# Patient Record
Sex: FEMALE | Race: WHITE | NOT HISPANIC OR LATINO | Employment: FULL TIME | URBAN - METROPOLITAN AREA
[De-identification: names, ages, dates, MRNs, and addresses within clinical notes are randomized per-mention and may not be internally consistent; named-entity substitution may affect disease eponyms.]

---

## 2022-07-06 ENCOUNTER — TELEPHONE (OUTPATIENT)
Dept: SCHEDULING | Facility: IMAGING CENTER | Age: 22
End: 2022-07-06

## 2022-08-04 ENCOUNTER — OFFICE VISIT (OUTPATIENT)
Dept: URGENT CARE | Facility: PHYSICIAN GROUP | Age: 22
End: 2022-08-04
Payer: COMMERCIAL

## 2022-08-04 VITALS
OXYGEN SATURATION: 98 % | RESPIRATION RATE: 12 BRPM | HEIGHT: 67 IN | BODY MASS INDEX: 26.68 KG/M2 | WEIGHT: 170 LBS | SYSTOLIC BLOOD PRESSURE: 140 MMHG | HEART RATE: 112 BPM | TEMPERATURE: 97.8 F | DIASTOLIC BLOOD PRESSURE: 88 MMHG

## 2022-08-04 DIAGNOSIS — G43.909 MIGRAINE WITHOUT STATUS MIGRAINOSUS, NOT INTRACTABLE, UNSPECIFIED MIGRAINE TYPE: ICD-10-CM

## 2022-08-04 DIAGNOSIS — R11.0 NAUSEA: ICD-10-CM

## 2022-08-04 PROCEDURE — 99203 OFFICE O/P NEW LOW 30 MIN: CPT | Performed by: FAMILY MEDICINE

## 2022-08-04 RX ORDER — KETOROLAC TROMETHAMINE 30 MG/ML
30 INJECTION, SOLUTION INTRAMUSCULAR; INTRAVENOUS ONCE
Status: COMPLETED | OUTPATIENT
Start: 2022-08-04 | End: 2022-08-04

## 2022-08-04 RX ORDER — ONDANSETRON 4 MG/1
8 TABLET, ORALLY DISINTEGRATING ORAL ONCE
Status: COMPLETED | OUTPATIENT
Start: 2022-08-04 | End: 2022-08-04

## 2022-08-04 RX ADMIN — ONDANSETRON 8 MG: 4 TABLET, ORALLY DISINTEGRATING ORAL at 15:51

## 2022-08-04 RX ADMIN — KETOROLAC TROMETHAMINE 30 MG: 30 INJECTION, SOLUTION INTRAMUSCULAR; INTRAVENOUS at 15:51

## 2022-08-04 ASSESSMENT — ENCOUNTER SYMPTOMS
FEVER: 0
CHILLS: 0
HEADACHES: 1
VOMITING: 0
DIZZINESS: 0
SORE THROAT: 0
SHORTNESS OF BREATH: 0
COUGH: 0
NAUSEA: 1
MYALGIAS: 0

## 2022-08-04 NOTE — PROGRESS NOTES
"Subjective:   Deja Collins is a 21 y.o. female who presents for Migraine ([id number] )        Migraine  Headache pattern:  Some headache always there, and the pain level varies (Reports migraine headache, bilateral posterior orbital over the past 3 weeks similar to previous migraines)  Duration:  2 to 4 weeks  Laterality:  Both sides at the same time  Aggravating factors: Lifting heavy objects, pain compartment exposure.  Other bilateral symptoms:  Reports intermittent nausea, recent negative pregnancy testing, denies pregnancy  Abortive medications tried:  Rizatriptan    PMH:  has no past medical history on file.  MEDS: No current outpatient medications on file.    Current Facility-Administered Medications:   •  ketorolac (TORADOL) injection 30 mg, 30 mg, Intramuscular, Once, Ross Correa M.D.  •  ondansetron (ZOFRAN ODT) dispertab 8 mg, 8 mg, Oral, Once, Ross Correa M.D.  ALLERGIES: Not on File  SURGHX: History reviewed. No pertinent surgical history.  SOCHX:    FH: History reviewed. No pertinent family history.  Review of Systems   Constitutional: Negative for chills and fever.   HENT: Negative for sore throat.    Respiratory: Negative for cough and shortness of breath.    Gastrointestinal: Positive for nausea. Negative for vomiting.   Genitourinary: Negative for dysuria.   Musculoskeletal: Negative for myalgias.   Skin: Negative for rash.   Neurological: Positive for headaches. Negative for dizziness.        Objective:   BP (!) 140/88 (BP Location: Left arm, Patient Position: Sitting, BP Cuff Size: Adult)   Pulse (!) 112   Temp 36.6 °C (97.8 °F) (Temporal)   Resp 12   Ht 1.702 m (5' 7\")   Wt 77.1 kg (170 lb)   SpO2 98%   BMI 26.63 kg/m²   Physical Exam  Vitals and nursing note reviewed.   Constitutional:       General: She is not in acute distress.     Appearance: She is well-developed.   HENT:      Head: Normocephalic and atraumatic.      Right Ear: External ear normal.      Left Ear: External ear " normal.      Nose: Nose normal.      Mouth/Throat:      Mouth: Mucous membranes are moist.   Eyes:      Extraocular Movements: Extraocular movements intact.      Conjunctiva/sclera: Conjunctivae normal.      Pupils: Pupils are equal, round, and reactive to light.      Funduscopic exam:     Right eye: No hemorrhage or papilledema.         Left eye: No hemorrhage or papilledema.   Cardiovascular:      Rate and Rhythm: Normal rate.   Pulmonary:      Effort: Pulmonary effort is normal. No respiratory distress.      Breath sounds: Normal breath sounds.   Abdominal:      General: There is no distension.   Musculoskeletal:         General: Normal range of motion.   Skin:     General: Skin is warm and dry.   Neurological:      General: No focal deficit present.      Mental Status: She is alert and oriented to person, place, and time. Mental status is at baseline.      Gait: Gait (gait at baseline) normal.   Psychiatric:         Judgment: Judgment normal.           Assessment/Plan:   1. Migraine without status migrainosus, not intractable, unspecified migraine type  - ketorolac (TORADOL) injection 30 mg    2. Nausea  - ondansetron (ZOFRAN ODT) dispertab 8 mg        Medical Decision Making/Course:  In the course of preparing for this visit with review of the pertinent past medical history, recent and past clinic visits, current medications, and performing chart, immunization, medical history and medication reconciliation, and in the further course of obtaining the current history pertinent to the clinic visit today, performing an exam and evaluation, ordering and independently evaluating labs, tests  , and/or procedures, prescribing any recommended new medications as noted above including administration of ketorolac 30 mg intramuscular once and ondansetron 8 mg orally once during urgent care course, providing any pertinent counseling and education and recommending further coordination of care including recommendations for  symptomatic and supportive measures and drafting work excuse letter, at least  35 minutes of total time were spent during this encounter.      Discussed close monitoring, return precautions, and supportive measures of maintaining adequate fluid hydration and caloric intake, relative rest and symptom management as needed for pain and/or fever.    Differential diagnosis, natural history, supportive care, and indications for immediate follow-up discussed.     Advised the patient to follow-up with the primary care physician for recheck, reevaluation, and consideration of further management.    Please note that this dictation was created using voice recognition software. I have worked with consultants from the vendor as well as technical experts from [employer] to optimize the interface. I have made every reasonable attempt to correct obvious errors, but I expect that there are errors of grammar and possibly content that I did not discover before finalizing the note.

## 2022-08-04 NOTE — LETTER
August 4, 2022         Patient: Deja Collins   YOB: 2000   Date of Visit: 8/4/2022           To Whom it May Concern:    Deja Collins was seen in my clinic on 8/4/2022. Excuse 8/4/2022, 8/5/2022.  May return to work on 8/6/2022.    If you have any questions or concerns, please don't hesitate to call.        Sincerely,           Ross Correa M.D.  Electronically Signed

## 2023-11-15 ENCOUNTER — APPOINTMENT (OUTPATIENT)
Dept: RADIOLOGY | Facility: MEDICAL CENTER | Age: 23
End: 2023-11-15
Attending: STUDENT IN AN ORGANIZED HEALTH CARE EDUCATION/TRAINING PROGRAM
Payer: COMMERCIAL

## 2023-11-15 ENCOUNTER — HOSPITAL ENCOUNTER (EMERGENCY)
Facility: MEDICAL CENTER | Age: 23
End: 2023-11-15
Attending: STUDENT IN AN ORGANIZED HEALTH CARE EDUCATION/TRAINING PROGRAM
Payer: COMMERCIAL

## 2023-11-15 ENCOUNTER — OFFICE VISIT (OUTPATIENT)
Dept: URGENT CARE | Facility: PHYSICIAN GROUP | Age: 23
End: 2023-11-15
Payer: COMMERCIAL

## 2023-11-15 VITALS
TEMPERATURE: 97.9 F | SYSTOLIC BLOOD PRESSURE: 178 MMHG | RESPIRATION RATE: 20 BRPM | HEIGHT: 67 IN | HEART RATE: 122 BPM | OXYGEN SATURATION: 99 % | BODY MASS INDEX: 27.34 KG/M2 | WEIGHT: 174.16 LBS | DIASTOLIC BLOOD PRESSURE: 104 MMHG

## 2023-11-15 VITALS
SYSTOLIC BLOOD PRESSURE: 112 MMHG | RESPIRATION RATE: 16 BRPM | WEIGHT: 172 LBS | DIASTOLIC BLOOD PRESSURE: 64 MMHG | HEART RATE: 108 BPM | TEMPERATURE: 98.2 F | OXYGEN SATURATION: 100 % | BODY MASS INDEX: 27 KG/M2 | HEIGHT: 67 IN

## 2023-11-15 DIAGNOSIS — R10.31 RIGHT LOWER QUADRANT ABDOMINAL PAIN: ICD-10-CM

## 2023-11-15 DIAGNOSIS — R10.31 RLQ ABDOMINAL PAIN: ICD-10-CM

## 2023-11-15 LAB
ALBUMIN SERPL BCP-MCNC: 4.8 G/DL (ref 3.2–4.9)
ALBUMIN/GLOB SERPL: 1.8 G/DL
ALP SERPL-CCNC: 66 U/L (ref 30–99)
ALT SERPL-CCNC: 18 U/L (ref 2–50)
ANION GAP SERPL CALC-SCNC: 16 MMOL/L (ref 7–16)
APPEARANCE UR: CLEAR
AST SERPL-CCNC: 17 U/L (ref 12–45)
BASOPHILS # BLD AUTO: 1 % (ref 0–1.8)
BASOPHILS # BLD: 0.09 K/UL (ref 0–0.12)
BILIRUB SERPL-MCNC: 1.5 MG/DL (ref 0.1–1.5)
BILIRUB UR QL STRIP.AUTO: NEGATIVE
BUN SERPL-MCNC: 9 MG/DL (ref 8–22)
CALCIUM ALBUM COR SERPL-MCNC: 9.1 MG/DL (ref 8.5–10.5)
CALCIUM SERPL-MCNC: 9.7 MG/DL (ref 8.4–10.2)
CHLORIDE SERPL-SCNC: 102 MMOL/L (ref 96–112)
CO2 SERPL-SCNC: 21 MMOL/L (ref 20–33)
COLOR UR: YELLOW
CREAT SERPL-MCNC: 0.64 MG/DL (ref 0.5–1.4)
EOSINOPHIL # BLD AUTO: 0.13 K/UL (ref 0–0.51)
EOSINOPHIL NFR BLD: 1.5 % (ref 0–6.9)
ERYTHROCYTE [DISTWIDTH] IN BLOOD BY AUTOMATED COUNT: 36.5 FL (ref 35.9–50)
GFR SERPLBLD CREATININE-BSD FMLA CKD-EPI: 128 ML/MIN/1.73 M 2
GLOBULIN SER CALC-MCNC: 2.7 G/DL (ref 1.9–3.5)
GLUCOSE SERPL-MCNC: 89 MG/DL (ref 65–99)
GLUCOSE UR STRIP.AUTO-MCNC: NEGATIVE MG/DL
HCG SERPL QL: NEGATIVE
HCT VFR BLD AUTO: 45.7 % (ref 37–47)
HGB BLD-MCNC: 16 G/DL (ref 12–16)
IMM GRANULOCYTES # BLD AUTO: 0.02 K/UL (ref 0–0.11)
IMM GRANULOCYTES NFR BLD AUTO: 0.2 % (ref 0–0.9)
KETONES UR STRIP.AUTO-MCNC: NEGATIVE MG/DL
LEUKOCYTE ESTERASE UR QL STRIP.AUTO: NEGATIVE
LIPASE SERPL-CCNC: 24 U/L (ref 11–82)
LYMPHOCYTES # BLD AUTO: 2.17 K/UL (ref 1–4.8)
LYMPHOCYTES NFR BLD: 25.2 % (ref 22–41)
MCH RBC QN AUTO: 31.5 PG (ref 27–33)
MCHC RBC AUTO-ENTMCNC: 35 G/DL (ref 32.2–35.5)
MCV RBC AUTO: 90 FL (ref 81.4–97.8)
MICRO URNS: NORMAL
MONOCYTES # BLD AUTO: 0.67 K/UL (ref 0–0.85)
MONOCYTES NFR BLD AUTO: 7.8 % (ref 0–13.4)
NEUTROPHILS # BLD AUTO: 5.53 K/UL (ref 1.82–7.42)
NEUTROPHILS NFR BLD: 64.3 % (ref 44–72)
NITRITE UR QL STRIP.AUTO: NEGATIVE
NRBC # BLD AUTO: 0 K/UL
NRBC BLD-RTO: 0 /100 WBC (ref 0–0.2)
PH UR STRIP.AUTO: 5.5 [PH] (ref 5–8)
PLATELET # BLD AUTO: 265 K/UL (ref 164–446)
PMV BLD AUTO: 10 FL (ref 9–12.9)
POTASSIUM SERPL-SCNC: 3.9 MMOL/L (ref 3.6–5.5)
PROT SERPL-MCNC: 7.5 G/DL (ref 6–8.2)
PROT UR QL STRIP: NEGATIVE MG/DL
RBC # BLD AUTO: 5.08 M/UL (ref 4.2–5.4)
RBC UR QL AUTO: NEGATIVE
SODIUM SERPL-SCNC: 139 MMOL/L (ref 135–145)
SP GR UR STRIP.AUTO: 1.01
WBC # BLD AUTO: 8.6 K/UL (ref 4.8–10.8)

## 2023-11-15 PROCEDURE — 3078F DIAST BP <80 MM HG: CPT

## 2023-11-15 PROCEDURE — 83690 ASSAY OF LIPASE: CPT

## 2023-11-15 PROCEDURE — 85025 COMPLETE CBC W/AUTO DIFF WBC: CPT

## 2023-11-15 PROCEDURE — 80053 COMPREHEN METABOLIC PANEL: CPT

## 2023-11-15 PROCEDURE — 84703 CHORIONIC GONADOTROPIN ASSAY: CPT

## 2023-11-15 PROCEDURE — 99284 EMERGENCY DEPT VISIT MOD MDM: CPT

## 2023-11-15 PROCEDURE — 36415 COLL VENOUS BLD VENIPUNCTURE: CPT

## 2023-11-15 PROCEDURE — 76830 TRANSVAGINAL US NON-OB: CPT

## 2023-11-15 PROCEDURE — 700111 HCHG RX REV CODE 636 W/ 250 OVERRIDE (IP): Performed by: STUDENT IN AN ORGANIZED HEALTH CARE EDUCATION/TRAINING PROGRAM

## 2023-11-15 PROCEDURE — 3074F SYST BP LT 130 MM HG: CPT

## 2023-11-15 PROCEDURE — 76705 ECHO EXAM OF ABDOMEN: CPT

## 2023-11-15 PROCEDURE — 99213 OFFICE O/P EST LOW 20 MIN: CPT

## 2023-11-15 PROCEDURE — A9270 NON-COVERED ITEM OR SERVICE: HCPCS | Performed by: STUDENT IN AN ORGANIZED HEALTH CARE EDUCATION/TRAINING PROGRAM

## 2023-11-15 PROCEDURE — 81003 URINALYSIS AUTO W/O SCOPE: CPT

## 2023-11-15 PROCEDURE — 700102 HCHG RX REV CODE 250 W/ 637 OVERRIDE(OP): Performed by: STUDENT IN AN ORGANIZED HEALTH CARE EDUCATION/TRAINING PROGRAM

## 2023-11-15 RX ORDER — OXYCODONE HYDROCHLORIDE 5 MG/1
5 TABLET ORAL ONCE
Status: COMPLETED | OUTPATIENT
Start: 2023-11-15 | End: 2023-11-15

## 2023-11-15 RX ORDER — BUPROPION HYDROCHLORIDE 150 MG/1
150 TABLET, EXTENDED RELEASE ORAL 2 TIMES DAILY
COMMUNITY

## 2023-11-15 RX ORDER — ONDANSETRON 4 MG/1
4 TABLET, ORALLY DISINTEGRATING ORAL ONCE
Status: COMPLETED | OUTPATIENT
Start: 2023-11-15 | End: 2023-11-15

## 2023-11-15 RX ORDER — METHYLPHENIDATE HYDROCHLORIDE 20 MG/1
20 TABLET ORAL 2 TIMES DAILY
COMMUNITY

## 2023-11-15 RX ORDER — PRAZOSIN HYDROCHLORIDE 1 MG/1
1 CAPSULE ORAL NIGHTLY
COMMUNITY

## 2023-11-15 RX ORDER — ARIPIPRAZOLE 10 MG/1
10 TABLET ORAL DAILY
COMMUNITY

## 2023-11-15 RX ORDER — GABAPENTIN 100 MG/1
100 CAPSULE ORAL 3 TIMES DAILY
COMMUNITY

## 2023-11-15 RX ADMIN — OXYCODONE HYDROCHLORIDE 5 MG: 5 TABLET ORAL at 17:52

## 2023-11-15 RX ADMIN — ONDANSETRON 4 MG: 4 TABLET, ORALLY DISINTEGRATING ORAL at 17:52

## 2023-11-15 ASSESSMENT — ENCOUNTER SYMPTOMS
DIARRHEA: 0
FEVER: 0
VOMITING: 0
CHILLS: 0
NAUSEA: 1
ABDOMINAL PAIN: 1

## 2023-11-15 ASSESSMENT — FIBROSIS 4 INDEX
FIB4 SCORE: 0.29
FIB4 SCORE: 0.29

## 2023-11-15 ASSESSMENT — PAIN DESCRIPTION - PAIN TYPE: TYPE: ACUTE PAIN

## 2023-11-15 NOTE — ED TRIAGE NOTES
"Chief Complaint   Patient presents with    Abdominal Pain     RLQ abd pain vs pelvic pain. Reports hx of ovarian cysts and states this feels similar \"except that the pain goes through to my back this time\". Reports she was sent here from  for r/o appy. Denies V/D or fevers.        "

## 2023-11-15 NOTE — PROGRESS NOTES
"Subjective:   Deja Collins is a 22 y.o. female who presents for Ovarian Cyst (Pt states she has hx of ovarian cyst and states she has been having sharp dull pain. Started on (R) side and now feels it on the (L) lower abdomen )    Patient presents to urgent care with complaints of right-sided abdomen pain starting yesterday.  She reports the pain as sharp and dull and occasionally radiates to her back but more recently feels like it is radiating to her left abdomen.  She does report decreased appetite.  Denies any nausea, vomiting or diarrhea.  She denies any fevers.  Denies any dysuria or frequency.  She does report a pertinent history of ovarian cysts.      Review of Systems   Constitutional:  Negative for chills and fever.   Gastrointestinal:  Positive for abdominal pain and nausea. Negative for diarrhea and vomiting.       Medications, Allergies, and current problem list reviewed today in Epic.     Objective:     /64 (BP Location: Left arm, Patient Position: Sitting, BP Cuff Size: Adult)   Pulse (!) 108   Temp 36.8 °C (98.2 °F) (Temporal)   Resp 16   Ht 1.702 m (5' 7\")   Wt 78 kg (172 lb)   SpO2 100%     Physical Exam  Constitutional:       General: She is not in acute distress.     Appearance: Normal appearance. She is not toxic-appearing.   Cardiovascular:      Rate and Rhythm: Normal rate and regular rhythm.      Pulses: Normal pulses.      Heart sounds: Normal heart sounds.   Pulmonary:      Effort: Pulmonary effort is normal.      Breath sounds: Normal breath sounds.   Abdominal:      General: Abdomen is flat. Bowel sounds are decreased. There is no distension.      Tenderness: There is abdominal tenderness in the right lower quadrant. There is rebound. There is no right CVA tenderness, left CVA tenderness or guarding. Positive signs include McBurney's sign.   Neurological:      Mental Status: She is alert.   Psychiatric:         Mood and Affect: Mood normal.         Behavior: Behavior normal. "         Assessment/Plan:     Diagnosis and associated orders:     1. Right lower quadrant abdominal pain           Comments/MDM:     Patient presents to urgent care with complaints of right lower abdominal pain x1 day.  She reports that the pain has radiated to her back but now is radiating to the left side of her abdomen.  She denies any nausea, vomiting or diarrhea.  She does report decreased appetite.  Upon physical exam patient is tender to palpation of right abdomen.  Positive McBurney sign.  Positive rebound.  She is clear to auscultation bilaterally.  Vital signs are stable in clinic, she is afebrile.  Given physical exam and presentation of symptoms.  Patient was advised to follow-up at ER for more immediate imaging and  work-up.  Patient verbalizes understanding and is agreeable to plan.  States she will be heading over to [facility] ER. Family in waiting room available to transport patient.   Transfer center notified.         Differential diagnosis, natural history, supportive care, and indications for immediate follow-up discussed.    Advised the patient to follow-up with the primary care physician for recheck, reevaluation, and consideration of further management.    Please note that this dictation was created using voice recognition software. I have made a reasonable attempt to correct obvious errors, but I expect that there are errors of grammar and possibly content that I did not discover before finalizing the note.    This note was electronically signed by KEKE Middleton

## 2023-11-16 NOTE — ED PROVIDER NOTES
"ED Provider Note    CHIEF COMPLAINT  Chief Complaint   Patient presents with    Abdominal Pain     RLQ abd pain vs pelvic pain. Reports hx of ovarian cysts and states this feels similar \"except that the pain goes through to my back this time\". Reports she was sent here from  for r/o appy. Denies V/D or fevers.        EXTERNAL RECORDS REVIEWED  Outpatient Notes Patient was seen at urgent care earlier today.  She presented with lower abdominal pain which started on the right side and had migrated to the left.  She has a history of ovarian cyst.    HPI/ROS  LIMITATION TO HISTORY   Select: : None  OUTSIDE HISTORIAN(S):  None    Deja Collins is a 22 y.o. female who presents right lower quadrant abdominal pain.  Her pain started yesterday and has been persistent since then.  She describes it as a sharp pain that radiates through to her back.  Patient has nausea but no associated vomiting.  She has been having normal bowel movements without diarrhea or constipation.  She denies any fevers or chills.  Patient has a history of ovarian cysts and has had prior cystectomies.  She says that she has had reoccurrence of the cysts and she says this feels like when her cysts have ruptured previously.  She denies other abdominal surgeries, no history of appendectomy.  She denies any dysuria, hematuria or urgency.  She has no vaginal discharge, odor.  She denies any concern for sexually transmitted infection.    PAST MEDICAL HISTORY   has a past medical history of ADHD, Bipolar affective (HCC), Migraines, Ovarian cyst, and PTSD (post-traumatic stress disorder).    SURGICAL HISTORY  patient denies any surgical history    FAMILY HISTORY  History reviewed. No pertinent family history.    SOCIAL HISTORY  Social History     Tobacco Use    Smoking status: Never    Smokeless tobacco: Never   Substance and Sexual Activity    Alcohol use: Yes     Comment: occ    Drug use: Yes    Sexual activity: Not on file       CURRENT MEDICATIONS  Home " "Medications       Reviewed by Chantelle Guardado R.N. (Registered Nurse) on 11/15/23 at 1512  Med List Status: Not Addressed     Medication Last Dose Status   ARIPiprazole (ABILIFY) 10 MG Tab  Active   buPROPion SR (WELLBUTRIN-SR) 150 MG TABLET SR 12 HR sustained-release tablet  Active   gabapentin (NEURONTIN) 100 MG Cap  Active   methylphenidate (RITALIN) 20 MG tablet  Active   prazosin (MINIPRESS) 1 MG Cap  Active                    ALLERGIES  No Known Allergies    PHYSICAL EXAM  VITAL SIGNS: BP (!) 178/104   Pulse (!) 122   Temp 36.6 °C (97.9 °F) (Temporal)   Resp 20   Ht 1.702 m (5' 7\")   Wt 79 kg (174 lb 2.6 oz)   LMP 10/31/2023 (Approximate)   SpO2 99%   BMI 27.28 kg/m²    Constitutional: Awake and alert . Non toxic  HENT: Normal inspection. Moist mucous membranes  Eyes: Normal inspection  Neck: Grossly normal range of motion.  Cardiovascular: Tachycardic heart rate, Normal rhythm.  Symmetric peripheral pulses.   Thorax & Lungs: No respiratory distress, No wheezing, No rales, No rhonchi, No chest tenderness.   Abdomen: Soft, non-distended, RLQ TTP, no rebound or guarding, no mass  Skin: No obvious rash.  Extremities: Warm, well perfused. No clubbing, cyanosis, edema   Neurologic: Grossly normal   Psychiatric: Normal for situation      DIAGNOSTIC STUDIES / PROCEDURES      LABS  Results for orders placed or performed during the hospital encounter of 11/15/23   CBC WITH DIFFERENTIAL   Result Value Ref Range    WBC 8.6 4.8 - 10.8 K/uL    RBC 5.08 4.20 - 5.40 M/uL    Hemoglobin 16.0 12.0 - 16.0 g/dL    Hematocrit 45.7 37.0 - 47.0 %    MCV 90.0 81.4 - 97.8 fL    MCH 31.5 27.0 - 33.0 pg    MCHC 35.0 32.2 - 35.5 g/dL    RDW 36.5 35.9 - 50.0 fL    Platelet Count 265 164 - 446 K/uL    MPV 10.0 9.0 - 12.9 fL    Neutrophils-Polys 64.30 44.00 - 72.00 %    Lymphocytes 25.20 22.00 - 41.00 %    Monocytes 7.80 0.00 - 13.40 %    Eosinophils 1.50 0.00 - 6.90 %    Basophils 1.00 0.00 - 1.80 %    Immature Granulocytes 0.20 " 0.00 - 0.90 %    Nucleated RBC 0.00 0.00 - 0.20 /100 WBC    Neutrophils (Absolute) 5.53 1.82 - 7.42 K/uL    Lymphs (Absolute) 2.17 1.00 - 4.80 K/uL    Monos (Absolute) 0.67 0.00 - 0.85 K/uL    Eos (Absolute) 0.13 0.00 - 0.51 K/uL    Baso (Absolute) 0.09 0.00 - 0.12 K/uL    Immature Granulocytes (abs) 0.02 0.00 - 0.11 K/uL    NRBC (Absolute) 0.00 K/uL   COMP METABOLIC PANEL   Result Value Ref Range    Sodium 139 135 - 145 mmol/L    Potassium 3.9 3.6 - 5.5 mmol/L    Chloride 102 96 - 112 mmol/L    Co2 21 20 - 33 mmol/L    Anion Gap 16.0 7.0 - 16.0    Glucose 89 65 - 99 mg/dL    Bun 9 8 - 22 mg/dL    Creatinine 0.64 0.50 - 1.40 mg/dL    Calcium 9.7 8.4 - 10.2 mg/dL    Correct Calcium 9.1 8.5 - 10.5 mg/dL    AST(SGOT) 17 12 - 45 U/L    ALT(SGPT) 18 2 - 50 U/L    Alkaline Phosphatase 66 30 - 99 U/L    Total Bilirubin 1.5 0.1 - 1.5 mg/dL    Albumin 4.8 3.2 - 4.9 g/dL    Total Protein 7.5 6.0 - 8.2 g/dL    Globulin 2.7 1.9 - 3.5 g/dL    A-G Ratio 1.8 g/dL   LIPASE   Result Value Ref Range    Lipase 24 11 - 82 U/L   URINALYSIS,CULTURE IF INDICATED    Specimen: Urine   Result Value Ref Range    Color Yellow     Character Clear     Specific Gravity 1.015 <1.035    Ph 5.5 5.0 - 8.0    Glucose Negative Negative mg/dL    Ketones Negative Negative mg/dL    Protein Negative Negative mg/dL    Bilirubin Negative Negative    Nitrite Negative Negative    Leukocyte Esterase Negative Negative    Occult Blood Negative Negative    Micro Urine Req see below    HCG QUAL SERUM   Result Value Ref Range    Beta-Hcg Qualitative Serum Negative Negative   ESTIMATED GFR   Result Value Ref Range    GFR (CKD-EPI) 128 >60 mL/min/1.73 m 2         RADIOLOGY  I have independently interpreted the diagnostic imaging associated with this visit and am waiting the final reading from the radiologist.   My preliminary interpretation is as follows: Normal flow to ovaries  Radiologist interpretation:   US-PELVIC COMPLETE (TRANSABDOMINAL/TRANSVAGINAL) (COMBO)    Final Result      1. Septate uterus.   2. Endometrial stripe thickness is appropriate for the early proliferative phase of the menstrual cycle.   3. Normal sonographic evaluation of the ovaries.      US-APPENDIX   Final Result      The appendix is not identified sonographically.            COURSE & MEDICAL DECISION MAKING    ED Observation Status? Yes; I am placing the patient in to an observation status due to a diagnostic uncertainty as well as therapeutic intensity. Patient placed in observation status at 5:42 PM, 11/15/2023.     Observation plan is as follows: Labs, U/S, Serial Exams    Upon Reevaluation, the patient's condition has: stayed the same, however requesting to leave without obtaining CT    Patient discharged from ED Observation status at 7:07 PM 1/15/2023    INITIAL ASSESSMENT, COURSE AND PLAN  Care Narrative: 22-year-old female with history of ovarian cyst presents with right lower quadrant abdominal pain.  She is hypertensive and tachycardic on arrival but is overall systemically well-appearing.  She has right lower quadrant tenderness on exam without rebound, guarding or signs peritonitis. Labs unremarkable, she has no leukocytosis.  She is not pregnant and ectopic pregnancy ruled out.  Urine without signs of infection. Doubt PID, she denies concern for STD. Patient strongly prefers not to have an IV at this time.  I explained that we will need an IV to obtain CT scan to rule out appendicitis.  With shared decision making she prefers to first pursue ultrasound first.  Ultrasound was unremarkable she has no signs of ovarian torsion, no free fluid, no ovarian cyst noted.  Unfortunately, on ultrasound unable to visualize the appendix.     I reevaluated patient and she still having right lower quadrant tenderness and I recommended CT to evaluate for acute appendicitis.  Although her lab work is reassuring she does not have a leukocytosis, I still think further work-up with cross sectional imaging is  indicated given location of the pain without other clear explanation.  I explained this to the patient but she declines a CT scan, she has fear of needles and IV's and would prefer to monitor her symptoms at home and return if persistent or worsening symptoms.  Patient is very reliable, she is here with her partner who also agrees that he will encourage her to come back to the ER if she is not improving.  She is not intoxicated and has decisional capacity.  She does look well and repeat exam benign. Repeat vital signs were not recorded but she was improved on my assessment. She declines any means of comfort or anxiolytics to facilitate CT or IV.  Patient understands the risks of leaving at this time and is willing to accept these risks.  She assures me that she will return if still having symptoms in 24 hrs.  Patient signed AMA paperwork and was discharged home.     DISPOSITION AND DISCUSSIONS  I have discussed management of the patient with the following physicians and LEIGHA's:  None    Discussion of management with other QHP or appropriate source(s): None     Escalation of care considered, and ultimately not performed:diagnostic imaging    Barriers to care at this time, including but not limited to:  None .     Decision tools and prescription drugs considered including, but not limited to:  None .    FINAL DIAGNOSIS  1. RLQ abdominal pain Acute          Electronically signed by: Bianca Delgado M.D., 11/15/2023 5:33 PM

## 2023-11-16 NOTE — ED NOTES
Assessment complete. Pt refused IV and just wants blood draw. Labs drawn and sent. Pt medicated per ERP order. Pt aware of POC

## 2023-11-16 NOTE — DISCHARGE INSTRUCTIONS
There is still a real possibility of appendicitis, it is therefore essential that you return immediately if you have persistent abdominal pain, vomiting, fevers, inability to eat or drink.  Please do not wait more than 24 hrs to be seen if you have persistent symptoms.

## 2023-11-16 NOTE — ED NOTES
Pt spoke to ERP regarding her with to leave AMA did no with to have CT done   No s/s of acute distress pt verbalized understanding family at bedside

## 2024-12-30 ENCOUNTER — OFFICE VISIT (OUTPATIENT)
Dept: URGENT CARE | Facility: CLINIC | Age: 24
End: 2024-12-30
Payer: COMMERCIAL

## 2024-12-30 VITALS
WEIGHT: 181.4 LBS | RESPIRATION RATE: 16 BRPM | HEIGHT: 67 IN | TEMPERATURE: 97.3 F | HEART RATE: 94 BPM | SYSTOLIC BLOOD PRESSURE: 122 MMHG | DIASTOLIC BLOOD PRESSURE: 78 MMHG | OXYGEN SATURATION: 99 % | BODY MASS INDEX: 28.47 KG/M2

## 2024-12-30 DIAGNOSIS — J45.41 MODERATE PERSISTENT ASTHMA WITH ACUTE EXACERBATION: ICD-10-CM

## 2024-12-30 DIAGNOSIS — J06.9 VIRAL UPPER RESPIRATORY TRACT INFECTION: ICD-10-CM

## 2024-12-30 DIAGNOSIS — R05.1 ACUTE COUGH: ICD-10-CM

## 2024-12-30 DIAGNOSIS — J02.9 PHARYNGITIS, UNSPECIFIED ETIOLOGY: ICD-10-CM

## 2024-12-30 LAB
FLUAV RNA SPEC QL NAA+PROBE: NEGATIVE
FLUBV RNA SPEC QL NAA+PROBE: NEGATIVE
RSV RNA SPEC QL NAA+PROBE: NEGATIVE
SARS-COV-2 RNA RESP QL NAA+PROBE: NEGATIVE

## 2024-12-30 RX ORDER — SERTRALINE HYDROCHLORIDE 100 MG/1
TABLET, FILM COATED ORAL
COMMUNITY
Start: 2024-11-08

## 2024-12-30 RX ORDER — METHYLPREDNISOLONE 4 MG/1
TABLET ORAL
Qty: 21 TABLET | Refills: 0 | Status: SHIPPED | OUTPATIENT
Start: 2024-12-30

## 2024-12-30 RX ORDER — DEXTROAMPHETAMINE SACCHARATE, AMPHETAMINE ASPARTATE, DEXTROAMPHETAMINE SULFATE AND AMPHETAMINE SULFATE 2.5; 2.5; 2.5; 2.5 MG/1; MG/1; MG/1; MG/1
TABLET ORAL
COMMUNITY
Start: 2024-12-11

## 2024-12-30 RX ORDER — BENZONATATE 100 MG/1
100 CAPSULE ORAL 3 TIMES DAILY PRN
Qty: 60 CAPSULE | Refills: 0 | Status: SHIPPED | OUTPATIENT
Start: 2024-12-30

## 2024-12-30 RX ORDER — ZOLPIDEM TARTRATE 5 MG/1
5 TABLET ORAL
COMMUNITY
Start: 2024-11-09

## 2024-12-30 RX ORDER — DEXAMETHASONE SODIUM PHOSPHATE 10 MG/ML
10 INJECTION INTRAMUSCULAR; INTRAVENOUS ONCE
Status: COMPLETED | OUTPATIENT
Start: 2024-12-30 | End: 2024-12-30

## 2024-12-30 RX ORDER — FLUTICASONE PROPIONATE 50 MCG
1 SPRAY, SUSPENSION (ML) NASAL DAILY
Qty: 16 G | Refills: 0 | Status: SHIPPED | OUTPATIENT
Start: 2024-12-30

## 2024-12-30 RX ADMIN — DEXAMETHASONE SODIUM PHOSPHATE 10 MG: 10 INJECTION INTRAMUSCULAR; INTRAVENOUS at 10:37

## 2024-12-30 ASSESSMENT — FIBROSIS 4 INDEX: FIB4 SCORE: 0.35

## 2024-12-30 ASSESSMENT — VISUAL ACUITY: OU: 1

## 2024-12-30 NOTE — PATIENT INSTRUCTIONS
"As we discussed your clinical condition would benefit from over-the-counter remedies:    Congestion:    Nasal rinsing with saline nasal spray or salt water (e.g., neti pot) can help relieve nasal dryness.    Breathe Right nasal strips at night for nasal congestion    Ponaris nasal emmollient for nasal congestion, dryness, and inflammation (do not use with iodine sensitivity)    Cool mist humidification, chest rubs, warm tea with honey, increased fluid intake to thin secretions    SALINE IRRIGATION/SPRAY 2 to 3 times per day    You may consider using a saline nasal irrigation (such as a \"Neti pot\" or similar device using sterile water, NOT tap water) or OTC saline nasal spray. Common brands include Mike Med, Sinex, [place] Nasal. May use short term nasal sprays, such as oxymetazoline (Afrin) to help relieve nasal discomfort, congestion, and/or pressure. Decongestant sprays should not be used longer than three consecutive days.     Over the counter medications:    OTC second generation antihistamine daily (cetirizine, desloratadine, fexofenadine, levocetirizine, and loratadine) daily IN COMBINATION WITH:    FLONASE (once per day) x 2 weeks     You may consider intranasal steroids such as fluticasone (brand name Flonase), (other options include Nasonex, Rhinocort, Nasacort) daily; continue for at least 2-3 weeks. Any generic should work as well but may cause slightly more nasal irritation. Please take according to package directions.  This steroid will help reduce inflammation of your sinuses.  This is the number one medication to help with seasonal allergies and treat nasal inflammation.  Cost: around $8 at [facility] for the generic fluticasone [facility] product (as of 5/20).    SUDAFED (low dose to see if tolerated) daily x 4-5 days    You may consider over-the-counter Sudafed (pseudoephedrine) to act as a decongestant to improve your breathing through your nose.  Please do not use this medication if you already have known " high blood pressure.  Please take according to package directions and note that this has a stimulating effect and should not be taken late in the day.  There is a low dose 12 hour formulation and a higher dose 24 hour formulation.  Start with a low dose to make sure you tolerate the medication.  Do not take late in the day as it may interfere with sleep.   Cost: Around $6 for the generic [facility] branded product at a 10mg dose (as of 2/2023).      SORE THROAT:    Symptom management for a sore throat includes:     Sipping cold or warm beverages (eg, tea with honey or lemon)     Eating cold or frozen desserts (eg, ice cream, popsicles)    Sucking on ice    Sucking on hard candy, CEPACOL lozenges, or medicated throat sprays. These contain the active ingredient benzocaine which is an anesthetic agent.  It helps relieve the symptoms of sore throat and may diminish your cough reflex.Please note that taking too frequently may cause harm - pay attention to package directions.    Gargling with warm salt water -  ¼ to ½ teaspoon of salt per 8 ounces (approximately 240 mL) of warm water.  Cool mist humidification  OTC Tylenol/ibuprofen PRN for pain/fever     PAIN OR FEVER:    NSAIDs  You may take Ibuprofen (brand name Motrin or Advil) 600 to 800 mg may be taken up to three times per day taken with food (do not exceed 2400 mg per day).  If you prefer you may consider Naproxen (brand name Naprosyn or Aleve) around 500 mg twice per day with food (do not exceed 1200 mg per day). Please do not take if you have known stomach ulcers or known kidney disease.     TYLENOL  You may take Tylenol according to package directions (1000 mg every 8 hours not to exceed 3000 mg per day).  Please do not consume with any alcohol products, or if you have known or suspected liver disease. These will help reduce inflammation, help with pain control, and can reduce fevers.

## 2024-12-30 NOTE — PROGRESS NOTES
Verbal consent was acquired by the patient to use [employer] ambient listening note generation during this visit     Date: 12/30/24        Chief Complaint   Patient presents with    Cough     X4 days, her brother had these symptoms first now she has them    Nasal Drainage    Headache    Dizziness          History of Present Illness  The patient is a 24-year-old female who presents to the urgent care for evaluation of upper respiratory symptoms.    She began experiencing symptoms similar to her brother's illness approximately 4 to 5 days ago. Her symptoms include coughing, sneezing, and mild phlegm production. Yesterday, she developed chills and a hoarse voice, which has been disrupting her sleep due to throat discomfort and swelling. She reports severe postnasal drip, which is causing her to cough and lose her voice. Her condition has significantly worsened since its onset 4 days ago. She reports no ear discomfort, which is unusual for her as she typically develops sinus infections when ill. She does not have any nasal sprays at home. She works from home and does not require any refills for her inhaler. She has been using over-the-counter medications such as DayQuil and NyQuil, but these have only provided minimal relief.    She has a history of seasonal asthma and has been using Symbicort daily since the onset of her current symptoms. She also has a rescue inhaler but has not needed to use it recently.    Her sore throat is attributed to constant swallowing due to postnasal drainage. She reports no difficulty swallowing or drooling but mentions a sensation of food getting stuck in her throat.    MEDICATIONS  Symbicort, DayQuil, NyQuil         ROS:    No severe shortness of breath   No Cardiac like chest pain, as discussed   As otherwise stated in HPI    Medical/SX/ Social History:  Reviewed per chart    Pertinent Medications:    Current Outpatient Medications on File Prior to Visit   Medication Sig Dispense Refill     amphetamine-dextroamphetamine (ADDERALL) 10 MG Tab TAKE 1 TABLET BY MOUTH TWICE DAILY FOR ADHD      sertraline (ZOLOFT) 100 MG Tab       zolpidem (AMBIEN) 5 MG Tab Take 5 mg by mouth at bedtime as needed for Sleep.      prazosin (MINIPRESS) 1 MG Cap Take 1 mg by mouth every evening.      gabapentin (NEURONTIN) 100 MG Cap Take 100 mg by mouth 3 times a day.      buPROPion SR (WELLBUTRIN-SR) 150 MG TABLET SR 12 HR sustained-release tablet Take 150 mg by mouth 2 times a day.      ARIPiprazole (ABILIFY) 10 MG Tab Take 10 mg by mouth every day.      methylphenidate (RITALIN) 20 MG tablet Take 20 mg by mouth 2 times a day. (Patient not taking: Reported on 12/30/2024)       No current facility-administered medications on file prior to visit.        Allergies:    Tape     Problem list, medications, and allergies reviewed by myself today in Epic     Physical Exam:    Vitals:    12/30/24 1009   BP: 122/78   Pulse: 94   Resp: 16   Temp: 36.3 °C (97.3 °F)   SpO2: 99%             Physical Exam  Vitals reviewed.   Constitutional:       General: She is not in acute distress.     Appearance: Normal appearance. She is normal weight. She is not ill-appearing or toxic-appearing.   HENT:      Head: Normocephalic.      Right Ear: Ear canal and external ear normal. No tenderness. No middle ear effusion. Tympanic membrane is scarred. Tympanic membrane is not erythematous or bulging.      Left Ear: Ear canal and external ear normal. No tenderness.  No middle ear effusion. Tympanic membrane is scarred. Tympanic membrane is not erythematous or bulging.      Nose: Congestion and rhinorrhea present.      Mouth/Throat:      Mouth: Mucous membranes are moist.      Dentition: Normal dentition.      Tongue: No lesions. Tongue does not deviate from midline.      Palate: No mass and lesions.      Pharynx: Postnasal drip present. No pharyngeal swelling, oropharyngeal exudate, posterior oropharyngeal erythema or uvula swelling.      Tonsils: No  tonsillar exudate or tonsillar abscesses.   Eyes:      General: Lids are normal. Lids are everted, no foreign bodies appreciated. Vision grossly intact. Gaze aligned appropriately.      Extraocular Movements: Extraocular movements intact.      Conjunctiva/sclera:      Right eye: Right conjunctiva is injected. No chemosis, exudate or hemorrhage.     Left eye: Left conjunctiva is injected. No chemosis, exudate or hemorrhage.     Pupils: Pupils are equal, round, and reactive to light.   Cardiovascular:      Rate and Rhythm: Normal rate and regular rhythm.      Pulses: Normal pulses.      Heart sounds: Normal heart sounds.   Pulmonary:      Effort: Pulmonary effort is normal.      Breath sounds: Normal breath sounds. No stridor, decreased air movement or transmitted upper airway sounds. No decreased breath sounds, wheezing, rhonchi or rales.   Musculoskeletal:      Cervical back: Full passive range of motion without pain, normal range of motion and neck supple.   Neurological:      Mental Status: She is alert.        Diagnostics:    Results for orders placed or performed in visit on 12/30/24   POCT CoV-2, Flu A/B, RSV by PCR    Collection Time: 12/30/24 10:19 AM   Result Value Ref Range    SARS-CoV-2 by PCR Negative Negative, Invalid    Influenza virus A RNA Negative Negative, Invalid    Influenza virus B, PCR Negative Negative, Invalid    RSV, PCR Negative Negative, Invalid        Medical Decision making and plan :  I personally reviewed prior external notes and test results pertinent to today's visit. Pt is clinically stable at today's acute urgent care visit.  Patient appears nontoxic with no acute distress noted. Appropriate for outpatient care at this time.    Pleasant 24 y.o. female presented clinic with:     Assessment & Plan  1. Upper respiratory infection.  She reports symptoms starting 4-5 days ago, including coughing, sneezing, phlegm, chills, and postnasal drip. There is no exposure to strep or mono that  would warrant the use of strep testing at this time. The patient is well appearing, and in no acute distress. Discussed supportive measures at length.  POCT COVID, influenza, RSV testing negative.  No red flag symptoms on physical exam.  No evidence of secondary bacterial infection that would warrant the use of antibiotic therapy.  She will be given 10 mg of oral dexamethasone in the clinic to help decrease inflammation of the throat caused by viral illness. She is advised to perform nasal saline rinses 2-3 times a day, followed by Flonase once a day after the saline rinses. Sudafed is recommended for the next 4-5 days. If she has any allergy medication like Claritin or Zyrtec at home, she should start taking it daily. A prescription for cough medicine will be sent to [facility]. If symptoms worsen, she should return to the clinic.    2. Seasonal asthma.  She has been using Symbicort daily since the onset of symptoms and has not needed to use her rescue inhaler. She is advised to continue using Symbicort as directed. No refills for her inhaler are needed at this time.  She will be started on a Medrol Dosepak to decrease lung inflammation due to viral illness.    3.  Pharyngitis  The sore throat is likely due to postnasal drainage. Supportive measures include nasal saline rinses, Flonase, and Sudafed.  No exposure to strep or mono.  Politely declined strep testing in clinic.Per the [place] trial I will administer 10 mg of dexamethasone in clinic for pharyngitis.  I discussed the risks vs benefits as well as alternatives with the patient and using shared decision making we have elected this as a treatment modality.         Shared decision-making was utilized with patient for treatment plan. Medication discussed included indication for use and the potential benefits and side effects. Education was provided regarding the aforementioned assessments.  Differential Diagnosis, natural history, and supportive care discussed.  All of the patient's questions were answered to their satisfaction at the time of discharge. Patient was encouraged to monitor symptoms closely. Those signs and symptoms which would warrant concern and mandate seeking a higher level of service through the emergency department discussed at length.  Patient stated agreement and understanding of this plan of care.    Disposition:  Home in stable condition     Voice Recognition Disclaimer:  Portions of this document were created using voice recognition software and [employer] technology provided by [facility]. The software does have a chance of producing errors of grammar and possibly content. I have made every reasonable attempt to correct obvious errors, but there may be errors of grammar and possibly content that I did not discover before finalizing the  documentation.    DILLON Rodriguez.

## 2025-03-04 ENCOUNTER — APPOINTMENT (OUTPATIENT)
Dept: URGENT CARE | Facility: CLINIC | Age: 25
End: 2025-03-04
Payer: COMMERCIAL

## 2025-03-05 ENCOUNTER — HOSPITAL ENCOUNTER (EMERGENCY)
Facility: MEDICAL CENTER | Age: 25
End: 2025-03-05
Attending: STUDENT IN AN ORGANIZED HEALTH CARE EDUCATION/TRAINING PROGRAM
Payer: COMMERCIAL

## 2025-03-05 VITALS
OXYGEN SATURATION: 98 % | SYSTOLIC BLOOD PRESSURE: 117 MMHG | HEART RATE: 98 BPM | WEIGHT: 181.22 LBS | DIASTOLIC BLOOD PRESSURE: 74 MMHG | BODY MASS INDEX: 28.44 KG/M2 | HEIGHT: 67 IN | TEMPERATURE: 97.7 F | RESPIRATION RATE: 16 BRPM

## 2025-03-05 DIAGNOSIS — R45.851 SUICIDAL IDEATION: ICD-10-CM

## 2025-03-05 LAB — POC BREATHALIZER: 0 PERCENT (ref 0–0.01)

## 2025-03-05 PROCEDURE — 90791 PSYCH DIAGNOSTIC EVALUATION: CPT

## 2025-03-05 PROCEDURE — 99285 EMERGENCY DEPT VISIT HI MDM: CPT

## 2025-03-05 PROCEDURE — 302970 POC BREATHALIZER: Performed by: STUDENT IN AN ORGANIZED HEALTH CARE EDUCATION/TRAINING PROGRAM

## 2025-03-05 RX ORDER — GABAPENTIN 300 MG/1
300 CAPSULE ORAL 3 TIMES DAILY PRN
COMMUNITY

## 2025-03-05 RX ORDER — BUPROPION HYDROCHLORIDE 300 MG/1
300 TABLET ORAL DAILY
COMMUNITY

## 2025-03-05 RX ORDER — IBUPROFEN 200 MG
600 TABLET ORAL
COMMUNITY

## 2025-03-05 ASSESSMENT — FIBROSIS 4 INDEX: FIB4 SCORE: 0.35

## 2025-03-06 NOTE — ED NOTES
Patient provided discharge instructions, verbalizes understanding and denies any further questions. Patient instructed to follow up with [facility] and return if condition worsens. No distress noted. Patient ambulated to the front lobby with a steady gait and all belongings.

## 2025-03-06 NOTE — ED NOTES
Medication history reviewed with patient and spouse at bedside.   Med rec is complete  Allergies reviewed.     Patient has not had any outpatient antibiotics in the last 30 days.   Anticoagulants: No   Dispense history available in EPIC: DEANNA Blanton PhT
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
## 2025-03-06 NOTE — ED NOTES
Chief Complaint   Patient presents with    Suicidal Ideation     Pt ambulates to triage reports Suicidal thought intermittently x 2 months. Denies any plan. Has hx of depression, anxiety and PTSD. Pt compliant with medication. Calm and cooperative in triage.     Scored low risk on suicide scale.

## 2025-03-06 NOTE — ED PROVIDER NOTES
ED Provider Note    CHIEF COMPLAINT  Chief Complaint   Patient presents with    Suicidal Ideation     Pt ambulates to triage reports Suicidal thought intermittently x 2 months. Denies any plan. Has hx of depression, anxiety and PTSD. Pt compliant with medication. Calm and cooperative in triage.       EXTERNAL RECORDS REVIEWED  Other no recent contributory medical records available    HPI/ROS  LIMITATION TO HISTORY   Select: : None      Deja Collins is a 24 y.o. female who presents to the emergency department for evaluation of suicidal ideation.  The patient reports a history of longstanding depression which has been worsening over the last 2 months and has been acutely worse over the last week during which time she states she has been contemplating suicide.  She denies any specific plan nor having done anything to hurt herself today but is worried that if her mentation continues to worsen she will ultimately kill herself.  She denies doing anything to hurt or kill herself in the past.  She states she takes medications for depression which she is compliant to and have not been helping.  She has tried to establish care with an outpatient therapist or psychiatrist for a visit over the last week but has been unsuccessful prompting her to present today.    PAST MEDICAL HISTORY   has a past medical history of ADHD, Bipolar affective (HCC), Migraines, Ovarian cyst, and PTSD (post-traumatic stress disorder).    SURGICAL HISTORY  patient denies any surgical history    FAMILY HISTORY  No family history on file.    SOCIAL HISTORY  Social History     Tobacco Use    Smoking status: Never    Smokeless tobacco: Never   Substance and Sexual Activity    Alcohol use: Yes     Comment: occ    Drug use: Yes    Sexual activity: Not on file       CURRENT MEDICATIONS  Home Medications       Reviewed by Shae Jerome R.N. (Registered Nurse) on 03/05/25 at 1858  Med List Status: Partial     Medication Last Dose Status  "  amphetamine-dextroamphetamine (ADDERALL) 10 MG Tab  Active   ARIPiprazole (ABILIFY) 10 MG Tab  Active   benzonatate (TESSALON) 100 MG Cap  Active   buPROPion SR (WELLBUTRIN-SR) 150 MG TABLET SR 12 HR sustained-release tablet  Active   fluticasone (FLONASE) 50 MCG/ACT nasal spray  Active   gabapentin (NEURONTIN) 100 MG Cap  Active   methylphenidate (RITALIN) 20 MG tablet  Active   methylPREDNISolone (MEDROL DOSEPAK) 4 MG Tablet Therapy Pack  Active   prazosin (MINIPRESS) 1 MG Cap  Active   sertraline (ZOLOFT) 100 MG Tab  Active   zolpidem (AMBIEN) 5 MG Tab  Active                    ALLERGIES  Allergies   Allergen Reactions    Tape      Adhesive glue used at surgery site gave rash       PHYSICAL EXAM  VITAL SIGNS: BP (!) 151/101   Pulse (!) 105   Temp 36.3 °C (97.3 °F) (Temporal)   Resp 16   Ht 1.702 m (5' 7\")   Wt 82.2 kg (181 lb 3.5 oz)   LMP 02/19/2025   SpO2 99%   BMI 28.38 kg/m²    Constitutional: No acute distress, intermittently tearful but very pleasant, calm and cooperative.  HENT: Normocephalic, Atraumatic, Bilateral external ears normal. Nose normal.   Eyes: Pupils are equal and reactive. Conjunctiva normal, non-icteric.   Lungs: No respiratory distress  Musculoskeletal: No obvious deformity. No leg edema.  Skin: Warm, Dry, No erythema, No rash.   Neurologic: Alert and oriented x 3  Psychiatric: Appropriate affect for situation, suicidal      EKG/LABS  Labs Reviewed   POC BREATHALIZER - Normal   URINE DRUG SCREEN         COURSE & MEDICAL DECISION MAKING    ASSESSMENT, COURSE AND PLAN  Care Narrative:     Patient presents to the emergency department for evaluation of increasing thoughts of suicide without a specific plan or attempt made in the setting of worsening depression.  Patient demonstrates very good insight and has taken many steps to attempt to obtain outpatient evaluation and management for this problem but has yet been unsuccessful.  She arrives with a very supportive significant other " who she confided this and as well demonstrating a good support system.  She is not intoxicated and fully evaluable at this time.  I do feel she necessitates prompt psychiatric evaluation and care therefore behavioral health was consulted and spent time discussing options with the patient as well as myself.  [facility] currently has availability and confirmed that they would be able to assess the patient tonight.  Patient feels comfortable presenting there for walk-in evaluation with her  to drive her.  I feel that this is a very reasonable plan.  She was encouraged to return at any time however if feeling unsafe.  She was discharged in stable condition.      ADDITIONAL PROBLEMS MANAGED  None    DISPOSITION AND DISCUSSIONS  I have discussed management of the patient with the following physicians and LEIGHA's: None    Discussion of management with other Q or appropriate source(s): Behavioral Health Yesica      Escalation of care considered, and ultimately not performed:acute inpatient care management, however at this time, the patient is most appropriate for outpatient management    FINAL DIAGNOSIS  1. Suicidal ideation         Electronically signed by: Jaiden Lara M.D., 3/5/2025 7:27 PM

## 2025-03-06 NOTE — DISCHARGE INSTRUCTIONS
Please go to [facility] for an evaluation for inpatient psychiatric care.  Know that you can always return to the emergency department for help.  
yes

## 2025-04-26 ENCOUNTER — OFFICE VISIT (OUTPATIENT)
Dept: URGENT CARE | Facility: CLINIC | Age: 25
End: 2025-04-26
Payer: COMMERCIAL

## 2025-04-26 VITALS
RESPIRATION RATE: 18 BRPM | WEIGHT: 189.4 LBS | OXYGEN SATURATION: 97 % | BODY MASS INDEX: 29.73 KG/M2 | HEART RATE: 105 BPM | SYSTOLIC BLOOD PRESSURE: 112 MMHG | HEIGHT: 67 IN | DIASTOLIC BLOOD PRESSURE: 72 MMHG | TEMPERATURE: 97.4 F

## 2025-04-26 DIAGNOSIS — O21.9 NAUSEA AND VOMITING DURING PREGNANCY: ICD-10-CM

## 2025-04-26 PROCEDURE — 3078F DIAST BP <80 MM HG: CPT | Performed by: STUDENT IN AN ORGANIZED HEALTH CARE EDUCATION/TRAINING PROGRAM

## 2025-04-26 PROCEDURE — 3074F SYST BP LT 130 MM HG: CPT | Performed by: STUDENT IN AN ORGANIZED HEALTH CARE EDUCATION/TRAINING PROGRAM

## 2025-04-26 PROCEDURE — 99203 OFFICE O/P NEW LOW 30 MIN: CPT | Performed by: STUDENT IN AN ORGANIZED HEALTH CARE EDUCATION/TRAINING PROGRAM

## 2025-04-26 RX ORDER — DOXYLAMINE SUCCINATE AND PYRIDOXINE HYDROCHLORIDE, DELAYED RELEASE TABLETS 10 MG/10 MG 10; 10 MG/1; MG/1
TABLET, DELAYED RELEASE ORAL
Qty: 120 TABLET | Refills: 0 | Status: SHIPPED | OUTPATIENT
Start: 2025-04-26

## 2025-04-26 ASSESSMENT — FIBROSIS 4 INDEX: FIB4 SCORE: 0.35

## 2025-04-26 NOTE — PATIENT INSTRUCTIONS
Thank you for trusting [facility] for your medical care today. You were seen by Masoud Thompson MD. We hope that we were able to answer all of your questions, alleviate concerns, and create a plan going forward. If you need anything from us, don't hesitate to reach out.     If you develop any new or worsening symptoms that you believe need urgent or emergent evaluation, be sure to be reevaluated in urgent care or the emergency room.     Masoud Thompson MD  Urgent Care

## 2025-04-26 NOTE — PROGRESS NOTES
Urgent Care Visit Note  [facility]    04/26/25    Deja Collins     [address] [place] [address]     PCP: Pcp Pt States None     Subjective:     Chief Complaint   Patient presents with    Emesis     X1week unable to keep anything down/fatigue       HPI:  Deja Collins is a 24 y.o. female who presents for nausea and vomiting for 1 week with decreased appetite. Patient is pregnant via home pregnancy test and her LMP was about 6 weeks ago. No abdominal pain. No vaginal bleeding. This is a likely wanted pregnancy but patient still not entirely sure if she plans to proceed with pregnancy. No fever or  chills.     ROS:  ROS     CURRENT MEDICATIONS:  Current Outpatient Medications   Medication Sig Refill Last Dispense    ARIPiprazole (ABILIFY) 10 MG Tab Take 10 mg by mouth every day.  Unknown (patient-reported)    buPROPion (WELLBUTRIN XL) 300 MG XL tablet Take 300 mg by mouth every day.  Unknown (patient-reported)    Doxylamine-Pyridoxine 10-10 MG Tablet Delayed Response delayed-release tablet Take 1 tablet up to 4 times daily as needed or nausea in pregnancy 0 Unknown (outside pharmacy)    gabapentin (NEURONTIN) 300 MG Cap Take 300 mg by mouth 3 times a day as needed (pain).  Unknown (patient-reported)    ibuprofen (MOTRIN) 200 MG Tab Take 600 mg by mouth 1 time a day as needed for Mild Pain. 3 tablets= 600mg  Unknown (patient-reported)    Multiple Vitamins-Iron (MULTIVITAMIN PLUS IRON ADULT) Tab Take 1 Tablet by mouth every day.  Unknown (patient-reported)    omeprazole (PRILOSEC) 20 MG Tablet Delayed Response delayed-release tablet Take 20 mg by mouth 1 time a day as needed (heartburn).  Unknown (patient-reported)    prazosin (MINIPRESS) 1 MG Cap Take 1 mg by mouth every evening.  Unknown (patient-reported)    sertraline (ZOLOFT) 100 MG Tab Take 100 mg by mouth every day.  Unknown (patient-reported)    SUMATRIPTAN SUCCINATE PO Take 1 Tablet by mouth as needed (migraine).  Unknown (patient-reported)  "      ALLERGIES:   Allergies   Allergen Reactions    Tape Rash     Adhesive glue used at surgery site gave rash       PROBLEM LIST:    does not have a problem list on file.    Allergies, Medications, & Tobacco/Substance Use were reconciled by the Medical Assistant and reviewed by myself.     Objective:     /72   Pulse (!) 105   Temp 36.3 °C (97.4 °F) (Temporal)   Resp 18   Ht 1.702 m (5' 7\")   Wt 85.9 kg (189 lb 6.4 oz)   LMP 03/23/2025 (Exact Date)   SpO2 97%   BMI 29.66 kg/m²     Physical Exam  HENT:      Head: Normocephalic and atraumatic.      Right Ear: External ear normal.      Left Ear: External ear normal.      Nose: Nose normal.      Mouth/Throat:      Mouth: Mucous membranes are moist.   Eyes:      General:         Right eye: No discharge.         Left eye: No discharge.      Extraocular Movements: Extraocular movements intact.      Pupils: Pupils are equal, round, and reactive to light.   Cardiovascular:      Rate and Rhythm: Normal rate.      Pulses: Normal pulses.      Comments: HR of 88  Pulmonary:      Effort: Pulmonary effort is normal.   Abdominal:      General: Abdomen is flat.      Tenderness: There is no abdominal tenderness.   Skin:     General: Skin is warm.      Capillary Refill: Capillary refill takes less than 2 seconds.   Neurological:      Mental Status: She is alert and oriented to person, place, and time.   Psychiatric:         Mood and Affect: Mood normal.         Behavior: Behavior normal.                 Assessment/Plan:     Patient's history and physical exam consistent with:    Assessment & Plan  Nausea and vomiting during pregnancy    Orders:    Doxylamine-Pyridoxine 10-10 MG Tablet Delayed Response delayed-release tablet; Take 1 tablet up to 4 times daily as needed or nausea in pregnancy    Referral to OB/Gyn    Likely normal trimester nausea vomiting/morning sickness. Not abdominal pain. No other concerning symptoms. Prescribed first line nausea medication in first " trimester. Referral placed for OB. Spent 20 minutes going through medication list that patient is on to determine potential teratogenicity. However, I recommend she  calls her psychiatrist today asap to discuss how to manage her psychiatric medications or taper off of them if necessary. Many of the medications she is taking are category C so do exhibit some risk to the fetus so she need to follow up with psychiatry immediately to determine plan forward. Do not recommend stopping any of her medications without tapering due to risk to mother.     Discussed differential diagnosis, management options, risks/benefits, and alternatives to planned treatment. Pt expressed understanding and the treatment plan was agreed upon. Questions were encouraged and answered. Pt encouraged to return to urgent care as needed if new or worsening symptoms or if there is no improvement in condition. Pt educated in red flags and indications to immediately call 911 or present to the Emergency Department. Advised the patient to follow-up with the primary care physician for recheck, reevaluation, and further management.    I personally reviewed prior external notes and test results pertinent to today's visit. I have independently reviewed and interpreted all diagnostics ordered during this visit.    Please note that this dictation was created using voice recognition software. I have made a reasonable attempt to correct obvious errors, but I expect that there are errors of grammar and possibly content that I did not discover before finalizing the note.    This note was electronically signed by Masoud Thompson MD

## 2025-04-28 ENCOUNTER — OFFICE VISIT (OUTPATIENT)
Dept: URGENT CARE | Facility: CLINIC | Age: 25
End: 2025-04-28
Payer: COMMERCIAL

## 2025-04-28 VITALS
OXYGEN SATURATION: 100 % | DIASTOLIC BLOOD PRESSURE: 78 MMHG | TEMPERATURE: 98.6 F | RESPIRATION RATE: 14 BRPM | WEIGHT: 189.4 LBS | HEART RATE: 84 BPM | SYSTOLIC BLOOD PRESSURE: 102 MMHG | BODY MASS INDEX: 29.73 KG/M2 | HEIGHT: 67 IN

## 2025-04-28 DIAGNOSIS — R50.9 FEVER, UNSPECIFIED FEVER CAUSE: ICD-10-CM

## 2025-04-28 DIAGNOSIS — Z34.90 PREGNANCY, UNSPECIFIED GESTATIONAL AGE: ICD-10-CM

## 2025-04-28 DIAGNOSIS — J32.9 RHINOSINUSITIS: ICD-10-CM

## 2025-04-28 DIAGNOSIS — R05.1 ACUTE COUGH: ICD-10-CM

## 2025-04-28 RX ORDER — AMOXICILLIN 500 MG/1
1000 CAPSULE ORAL 3 TIMES DAILY
Qty: 30 CAPSULE | Refills: 0 | Status: CANCELLED | OUTPATIENT
Start: 2025-04-28 | End: 2025-05-03

## 2025-04-28 ASSESSMENT — FIBROSIS 4 INDEX: FIB4 SCORE: 0.35

## 2025-04-28 NOTE — PROGRESS NOTES
Subjective:     Verbal consent was acquired by the patient to use [employer] ambient listening note generation during this visit     Deja Collins is a 24 y.o. female who presents for Vomiting (Cough w/ mucus, phlegm is green and yellow, sore throat, sneezing, x1 week. Fever, x1 day. Pt is pregnant 1.5 month.)        History of Present Illness  The patient presents for evaluation of cough, fever, and shortness of breath.    A persistent cough began on 04/20/2025, initially accompanied by mucus production. The mucus changed color on 04/24/2025, and by 04/26/2025, a fever developed. Intermittent sore throat pain is experienced, particularly at night, which is attributed to mouth breathing during sleep. Despite attempts to manage symptoms with Mucinex, Robitussin, and Tylenol, the condition has not improved. The fever has been fluctuating between 99 and 103 degrees, a significant increase from the usual baseline of 95 degrees. No Tylenol has been taken today.    Shortness of breath is reported, which is somewhat alleviated by the use of an albuterol inhaler. A known history of seasonal asthma is present.    Additionally, an elevated heart rate has been noted since 03/2025, deviating from the normal rate of 70 beats per minute. Pregnancy is confirmed, approximately 1.5 months gestation, and an OB consultation has not yet occurred.            Medications:  ARIPiprazole Tabs  buPROPion  Doxylamine-Pyridoxine Tbec  gabapentin Caps  ibuprofen Tabs  Multivitamin Plus Iron Adult Tabs  omeprazole Tbec  prazosin Caps  sertraline Tabs  SUMATRIPTAN SUCCINATE PO    Allergies:             Tape    Past Social Hx:  Deja Collins  reports that she has never smoked. She has never used smokeless tobacco. She reports that she does not currently use alcohol. She reports that she does not currently use drugs.           Problem list, medications, and allergies reviewed by myself today in Epic.     Objective:     /78 (BP Location:  "Left arm, Patient Position: Sitting, BP Cuff Size: Adult)   Pulse 84   Temp 37 °C (98.6 °F) (Temporal)   Resp 14   Ht 1.702 m (5' 7\")   Wt 85.9 kg (189 lb 6.4 oz)   LMP 03/23/2025 (Exact Date)   SpO2 100%   BMI 29.66 kg/m²     Physical Exam  Vitals and nursing note reviewed.   Constitutional:       General: She is not in acute distress.     Appearance: Normal appearance. She is well-developed. She is not ill-appearing or toxic-appearing.   HENT:      Head: Normocephalic.      Right Ear: External ear normal. No tenderness. A middle ear effusion is present. No mastoid tenderness. Tympanic membrane is injected. Tympanic membrane is not perforated or bulging. Tympanic membrane has decreased mobility.      Left Ear: External ear normal. No tenderness. A middle ear effusion is present. No mastoid tenderness. Tympanic membrane is injected. Tympanic membrane is not perforated or bulging. Tympanic membrane has decreased mobility.      Nose: Mucosal edema, congestion and rhinorrhea present.      Right Nostril: No foreign body.      Left Nostril: No foreign body.      Right Turbinates: Swollen.      Left Turbinates: Swollen.      Right Sinus: Maxillary sinus tenderness and frontal sinus tenderness present.      Left Sinus: Maxillary sinus tenderness and frontal sinus tenderness present.      Mouth/Throat:      Mouth: Mucous membranes are moist.      Pharynx: Uvula midline. Posterior oropharyngeal erythema present. No pharyngeal swelling, oropharyngeal exudate or uvula swelling.      Tonsils: No tonsillar exudate or tonsillar abscesses.      Comments: Mild pharyngeal edema.  No tonsillar exudate.  Eyes:      Extraocular Movements: Extraocular movements intact.      Pupils: Pupils are equal, round, and reactive to light.   Cardiovascular:      Rate and Rhythm: Normal rate and regular rhythm.      Pulses: Normal pulses.      Heart sounds: Normal heart sounds. No murmur heard.  Pulmonary:      Effort: Pulmonary effort is " normal. No tachypnea or respiratory distress.      Breath sounds: Normal breath sounds and air entry. No stridor or decreased air movement. No decreased breath sounds, wheezing, rhonchi or rales.      Comments: Lungs are clear to auscultation bilaterally, no rhonchi rales or wheezes  Chest:      Chest wall: No tenderness.   Musculoskeletal:      Cervical back: Normal range of motion. No rigidity.   Lymphadenopathy:      Cervical: No cervical adenopathy.   Neurological:      Mental Status: She is alert.   Psychiatric:         Behavior: Behavior is cooperative.         Physical Exam  Mouth/Throat: Mucous membranes moist, no erythema, no exudate  Respiratory: Clear to auscultation, no wheezing, rales or rhonchi      Results for orders placed or performed in visit on 04/28/25   POCT CEPHEID COV-2, FLU A/B, RSV - PCR    Collection Time: 04/28/25 12:50 PM   Result Value Ref Range    SARS-CoV-2 by PCR Negative Negative, Invalid    Influenza virus A RNA Negative Negative, Invalid    Influenza virus B, PCR Negative Negative, Invalid    RSV, PCR Negative Negative, Invalid         Assessment/Plan:     Diagnosis and Associated Orders:     1. Fever, unspecified fever cause  - POCT CEPHEID COV-2, FLU A/B, RSV - PCR    2. Acute cough  - POCT CEPHEID COV-2, FLU A/B, RSV - PCR    3. Rhinosinusitis  - amoxicillin-clavulanate (AUGMENTIN) 875-125 MG Tab; Take 1 Tablet by mouth 2 times a day for 7 days.  Dispense: 14 Tablet; Refill: 0        Medical Decision Making:    Pleasant 24 y.o. presents to clinic with:  Dictation #1  MRN:[id number]  [facility]:[id number]   Assessment & Plan  This is a nontoxic-appearing 24-year-old female in her first trimester who presents with greater than 1 week of URI symptoms and history of fever.  Viral panel negative.  Given fever and pregnancy and greater than 1 week of symptoms will start on Augmentin to cover for bacterial sinusitis and RTI.  - Persistent cough started on 04/20/2025, with mucus production that  changed color on 04/24/2025.  - Physical exam reveals no significant abnormalities in lung sounds.    Fever.  - Fever has been present since 04/26/2025, fluctuating between 99 and 103 degrees.  - Current vitals show no fever, and oxygen levels are normal.  - Given the potential for a bacterial infection, Augmentin will be prescribed if the viral panel is negative.  - Advised to monitor symptoms closely and report any worsening conditions or increased shortness of breath.    Shortness of breath.  - Shortness of breath managed with an albuterol inhaler, which has been effective.  - History of seasonal asthma noted.  - If symptoms worsen, immediate notification to the clinic is advised.    Pregnancy.  - Approximately six weeks pregnant, awaiting an OB appointment scheduled for 05/22/2025.  - Safety of Augmentin during pregnancy has been confirmed.  - Advised to schedule an OB appointment as soon as possible and monitor for any changes in symptoms.    I personally reviewed prior external notes and test results pertinent to today's visit. Patient is clinically stable at today's urgent care visit.  Patient appears nontoxic with no acute distress noted. Appropriate for outpatient care at this time.  Return to clinic or go to ED if symptoms worsen or persist.  Red flag symptoms discussed.  Patient/Parent/Guardian voices understanding.   All side effects of medication discussed including allergic response, GI upset, tendon injury, rash, sedation etc    Please note that this dictation was created using voice recognition software. I have made a reasonable attempt to correct obvious errors, but I expect that there are errors of grammar and possibly content that I did not discover before finalizing the note.    This note was electronically signed by Any Sam PA-C